# Patient Record
Sex: MALE | Race: WHITE
[De-identification: names, ages, dates, MRNs, and addresses within clinical notes are randomized per-mention and may not be internally consistent; named-entity substitution may affect disease eponyms.]

---

## 2019-05-01 ENCOUNTER — HOSPITAL ENCOUNTER (OUTPATIENT)
Age: 64
End: 2019-05-01
Payer: COMMERCIAL

## 2019-05-01 VITALS
OXYGEN SATURATION: 91 % | SYSTOLIC BLOOD PRESSURE: 125 MMHG | HEART RATE: 102 BPM | DIASTOLIC BLOOD PRESSURE: 80 MMHG | RESPIRATION RATE: 20 BRPM

## 2019-05-01 VITALS — HEART RATE: 74 BPM

## 2019-05-01 DIAGNOSIS — J44.9: ICD-10-CM

## 2019-05-01 DIAGNOSIS — Z87.891: ICD-10-CM

## 2019-05-01 DIAGNOSIS — R06.02: ICD-10-CM

## 2019-05-01 DIAGNOSIS — Z12.2: Primary | ICD-10-CM

## 2019-05-01 PROCEDURE — 94618 PULMONARY STRESS TESTING: CPT

## 2019-05-01 PROCEDURE — 94060 EVALUATION OF WHEEZING: CPT

## 2019-05-01 PROCEDURE — 94729 DIFFUSING CAPACITY: CPT

## 2019-05-01 PROCEDURE — 94640 AIRWAY INHALATION TREATMENT: CPT

## 2019-05-01 PROCEDURE — 94726 PLETHYSMOGRAPHY LUNG VOLUMES: CPT

## 2019-08-01 ENCOUNTER — HOSPITAL ENCOUNTER (OUTPATIENT)
Age: 64
End: 2019-08-01
Payer: COMMERCIAL

## 2019-08-01 DIAGNOSIS — J44.9: Primary | ICD-10-CM

## 2019-08-01 PROCEDURE — 94618 PULMONARY STRESS TESTING: CPT

## 2024-10-01 ENCOUNTER — OFFICE VISIT (OUTPATIENT)
Dept: CARDIOLOGY | Facility: CLINIC | Age: 69
End: 2024-10-01
Payer: COMMERCIAL

## 2024-10-01 VITALS
HEIGHT: 69 IN | SYSTOLIC BLOOD PRESSURE: 120 MMHG | WEIGHT: 214 LBS | BODY MASS INDEX: 31.7 KG/M2 | HEART RATE: 73 BPM | DIASTOLIC BLOOD PRESSURE: 70 MMHG | OXYGEN SATURATION: 93 %

## 2024-10-01 DIAGNOSIS — I10 PRIMARY HYPERTENSION: ICD-10-CM

## 2024-10-01 DIAGNOSIS — R07.2 PRECORDIAL CHEST PAIN: Primary | ICD-10-CM

## 2024-10-01 DIAGNOSIS — G47.33 OSA (OBSTRUCTIVE SLEEP APNEA): ICD-10-CM

## 2024-10-01 PROCEDURE — 99204 OFFICE O/P NEW MOD 45 MIN: CPT | Performed by: NURSE PRACTITIONER

## 2024-10-01 RX ORDER — ATORVASTATIN CALCIUM 10 MG/1
1 TABLET, FILM COATED ORAL DAILY
COMMUNITY

## 2024-10-01 RX ORDER — AMLODIPINE BESYLATE 10 MG/1
1 TABLET ORAL DAILY
COMMUNITY

## 2024-10-01 RX ORDER — MONTELUKAST SODIUM 4 MG/1
4 TABLET, CHEWABLE ORAL NIGHTLY
COMMUNITY

## 2024-10-01 RX ORDER — FLUTICASONE FUROATE, UMECLIDINIUM BROMIDE AND VILANTEROL TRIFENATATE 100; 62.5; 25 UG/1; UG/1; UG/1
1 POWDER RESPIRATORY (INHALATION) DAILY
COMMUNITY
Start: 2024-09-16

## 2024-10-01 RX ORDER — HYDROXYZINE HYDROCHLORIDE 25 MG/1
TABLET, FILM COATED ORAL
COMMUNITY

## 2024-10-01 RX ORDER — EMPAGLIFLOZIN 10 MG/1
TABLET, FILM COATED ORAL
COMMUNITY

## 2024-10-01 RX ORDER — ALBUTEROL SULFATE 90 UG/1
INHALANT RESPIRATORY (INHALATION)
COMMUNITY

## 2024-10-01 RX ORDER — LISINOPRIL AND HYDROCHLOROTHIAZIDE 20; 25 MG/1; MG/1
1 TABLET ORAL 2 TIMES DAILY
COMMUNITY

## 2024-10-01 NOTE — ASSESSMENT & PLAN NOTE
He reports a previous history of ARSALAN but has never used PAP therapy and does not want to try at this time.  He reports that he sleeps very well and denies excessive daytime sleepiness or fatigue.

## 2024-10-01 NOTE — PROGRESS NOTES
[facility] Provider Note     Date:   10/01/2024   Name: Manuel Sanon  :   1955  PCP: Odalis Lieberman APRN    Chief Complaint   Patient presents with    Establish Care       Subjective     History of Present Illness  Manuel Sanon is a 69 y.o. male with past medical history of HTN, COPD, DM, GERD, Hypothyroidism, ARSALAN (not on PAP therapy) and an an MI in  due severe anemia who presents today for new patient evaluation for chest pain. Patient was evaluated at [facility] ER on 24 with complaints of chest pain. Workup in ER was unremarkable and he was instructed to follow up with cardiology for further evaluation and testing. EKG in ER revealed normal sinus rhythm with a right bundle branch block.  He has not had any recurrent episodes of chest tightness since he was evaluated in the ER.  He describes the pain as a heaviness and tightness in the center of his chest that lasted approximately 24 hours.  He he denies shortness of breath, palpitations, dizziness, lower extremity edema or syncope.  He had a previous left heart cath in  that showed near normal coronary arteries. He quit smoking in .  He reports a previous history of ARSALAN but has never used PAP therapy and does not want to try at this time.  He reports that he sleeps very well and denies excessive daytime sleepiness or fatigue.    Cardiac History  1. HTN  2. HLD  3. DM  4. ARSALAN not on PAP therapy    [facility] - Near normal coronary arteries     Reports Denies   Chest Pain [x] []   Shortness of Air [] [x]   Palpitations [] [x]   Edema [] [x]   Dizziness [] [x]   Syncope [] [x]       No Known Allergies    Current Outpatient Medications:     albuterol sulfate  (90 Base) MCG/ACT inhaler, INHALE 2 PUFFS BY MOUTH EVERY 4 HOURS, Disp: , Rfl:     amLODIPine (NORVASC) 10 MG tablet, Take 1 tablet by mouth Daily., Disp: , Rfl:     atorvastatin (LIPITOR) 10 MG tablet, Take 1 tablet by mouth Daily.,  "Disp: , Rfl:     hydrOXYzine (ATARAX) 25 MG tablet, TAKE 1 TABLET BY MOUTH DAILY AT BEDTIME AS NEEDED, Disp: , Rfl:     Jardiance 10 MG tablet tablet, TAKE 1 TABLET BY MOUTH EVERY DAY FOR DIABETES, Disp: , Rfl:     lisinopril-hydrochlorothiazide (PRINZIDE,ZESTORETIC) 20-25 MG per tablet, Take 1 tablet by mouth 2 (Two) Times a Day., Disp: , Rfl:     metFORMIN (GLUCOPHAGE) 1000 MG tablet, Take 1 tablet by mouth 2 (Two) Times a Day With Meals., Disp: , Rfl:     montelukast (SINGULAIR) 4 MG chewable tablet, Chew 1 tablet Every Night., Disp: , Rfl:     Trelegy Ellipta 100-62.5-25 MCG/ACT inhaler, Inhale 1 puff Daily., Disp: , Rfl:     Past Medical History:   Diagnosis Date    Colon cancer 2019    stage 3      History reviewed. No pertinent surgical history.  History reviewed. No pertinent family history.  Social History     Socioeconomic History    Marital status: Single   Tobacco Use    Smoking status: Former     Types: Cigarettes     Passive exposure: Past    Smokeless tobacco: Never   Vaping Use    Vaping status: Never Used   Substance and Sexual Activity    Alcohol use: Never    Drug use: Never    Sexual activity: Defer       Objective     Vital Signs:  /70   Pulse 73   Ht 175.3 cm (69\")   Wt 97.1 kg (214 lb)   SpO2 93%   BMI 31.60 kg/m²   Estimated body mass index is 31.6 kg/m² as calculated from the following:    Height as of this encounter: 175.3 cm (69\").    Weight as of this encounter: 97.1 kg (214 lb).       BMI is >= 30 and <35. (Class 1 Obesity). The following options were offered after discussion;: exercise counseling/recommendations      Physical Exam  Vitals reviewed.   Constitutional:       Appearance: Normal appearance.   HENT:      Head: Normocephalic.   Cardiovascular:      Rate and Rhythm: Normal rate and regular rhythm.      Heart sounds: Normal heart sounds.   Pulmonary:      Effort: Pulmonary effort is normal.      Breath sounds: Normal breath sounds.   Musculoskeletal:      Right lower " leg: No edema.      Left lower leg: No edema.   Skin:     General: Skin is warm and dry.      Capillary Refill: Capillary refill takes less than 2 seconds.   Neurological:      General: No focal deficit present.      Mental Status: He is alert and oriented to person, place, and time.   Psychiatric:         Mood and Affect: Mood normal.         Behavior: Behavior normal.           Recent hospitalization notes reviewed: [facility] ER notes reviewed.           Assessment and Plan     Diagnoses and all orders for this visit:    1. Precordial chest pain (Primary)  Assessment & Plan:  Recent episodes of chest tightness and heaviness.  ER evaluation on 9/28/2024 was unremarkable and he was referred here for further evaluation and testing.    - Stress echo to rule out ischemia.    Orders:  -     Adult Transthoracic Echo Complete W/ Cont if Necessary Per Protocol; Future  -     Adult Stress Echo W/ Cont or Stress Agent if Necessary Per Protocol; Future    2. Primary hypertension  Assessment & Plan:  Hypertension is stable and controlled  Continue current treatment regimen.  Dietary sodium restriction.  Weight loss.  Regular aerobic exercise.  Blood pressure will be reassessed in 1 month.      3. ARSALAN (obstructive sleep apnea)  Assessment & Plan:  He reports a previous history of ARSALAN but has never used PAP therapy and does not want to try at this time.  He reports that he sleeps very well and denies excessive daytime sleepiness or fatigue.          Recommendations: ER if symptoms increase and Report if any new/changing symptoms immediately          Follow Up  Return in about 4 weeks (around 10/29/2024) for cardiac testing results.  Patient was given instructions and counseling regarding his condition or for health maintenance advice. Please see specific information pulled into the AVS if appropriate.

## 2024-10-01 NOTE — ASSESSMENT & PLAN NOTE
Hypertension is stable and controlled  Continue current treatment regimen.  Dietary sodium restriction.  Weight loss.  Regular aerobic exercise.  Blood pressure will be reassessed in 1 month.

## 2024-10-01 NOTE — ASSESSMENT & PLAN NOTE
Recent episodes of chest tightness and heaviness.  ER evaluation on 9/28/2024 was unremarkable and he was referred here for further evaluation and testing.    - Stress echo to rule out ischemia.

## 2024-10-18 ENCOUNTER — TELEPHONE (OUTPATIENT)
Dept: CARDIOLOGY | Facility: CLINIC | Age: 69
End: 2024-10-18

## 2024-10-18 NOTE — TELEPHONE ENCOUNTER
Caller: Manuel Sanon    Relationship: Self    Best call back number: [phone]     What is the best time to reach you: ANY     What was the call regarding: PT REPORTS THAT THEY WERE GIVEN A CALL BUT THERE IS NO DOCUMENTATION IN THE CHART ABOUT THIS. IF THIS WAS NOT A MISTAKE, PLEASE CALL PT BACK.     Is it okay if the provider responds through MyChart: EITHER

## 2024-11-04 ENCOUNTER — OFFICE VISIT (OUTPATIENT)
Dept: CARDIOLOGY | Facility: CLINIC | Age: 69
End: 2024-11-04
Payer: COMMERCIAL

## 2024-11-04 VITALS
HEART RATE: 100 BPM | OXYGEN SATURATION: 94 % | HEIGHT: 69 IN | BODY MASS INDEX: 30.51 KG/M2 | DIASTOLIC BLOOD PRESSURE: 58 MMHG | SYSTOLIC BLOOD PRESSURE: 112 MMHG | RESPIRATION RATE: 18 BRPM | WEIGHT: 206 LBS

## 2024-11-04 DIAGNOSIS — G47.33 OSA (OBSTRUCTIVE SLEEP APNEA): ICD-10-CM

## 2024-11-04 DIAGNOSIS — I10 PRIMARY HYPERTENSION: ICD-10-CM

## 2024-11-04 DIAGNOSIS — R07.2 PRECORDIAL CHEST PAIN: Primary | ICD-10-CM

## 2024-11-04 PROCEDURE — 99214 OFFICE O/P EST MOD 30 MIN: CPT | Performed by: NURSE PRACTITIONER

## 2024-11-04 NOTE — ASSESSMENT & PLAN NOTE
Hypertension is stable and controlled  Continue current treatment regimen.  Dietary sodium restriction.  Weight loss.  Regular aerobic exercise.  Blood pressure will be reassessed in 3 months

## 2024-11-04 NOTE — PROGRESS NOTES
[facility] Provider Note     Date:   2024   Name: Manuel Sanon  :   1955  PCP: Odalis Lieberman APRN    Chief Complaint   Patient presents with    Hypertension       Subjective     History of Present Illness  Manuel Sanon is a 69 y.o. male with past medical history of HTN, COPD, DM, GERD, Hypothyroidism, ARSALAN (not on PAP therapy) and an an MI in  due severe anemia who presents today for follow up on cardiac testing. Patient was evaluated at [facility] ER on 24 with complaints of chest pain. Workup in ER was unremarkable. He completed an echocardiogram on 10/3/2024 that revealed an LVEF of 66 to 70%, grade 1 diastolic dysfunction and no significant valvular regurgitation or stenosis.  He completed a stress echo today that revealed an abnormal stress echo consistent with an intermediate risk study for myocardial ischemia.    He has not had any recurrent episodes of chest pain since he was evaluated in the ER.  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  He denies any exertional chest pain or shortness of breath.  He reports that all of his symptoms have resolved since ER visit.  He had a previous left heart cath in  that showed near normal coronary arteries. He quit smoking in .  He reports a previous history of ARSALAN but has never used PAP therapy and does not want to try at this time.  He reports that he sleeps very well and denies excessive daytime sleepiness or fatigue.     Cardiac History  1. HTN  2. HLD  3. DM  4. ARSALAN not on PAP therapy    Stress Echo 24-abnormal stress echo consistent with an intermediate risk study for myocardial ischemia.    Echocardiogram 10/3/2024-LVEF 66-70%.  Grade 1 diastolic dysfunction.  Normal RVSP.  No significant valvular regurgitation or stenosis.    [facility] - Near normal coronary arteries     Reports Denies   Chest Pain [] [x]   Shortness of Air [] [x]   Palpitations [] [x]   Edema [] [x]  "  Dizziness [] [x]   Syncope [] [x]       No Known Allergies    Current Outpatient Medications:     albuterol sulfate  (90 Base) MCG/ACT inhaler, INHALE 2 PUFFS BY MOUTH EVERY 4 HOURS, Disp: , Rfl:     amLODIPine (NORVASC) 10 MG tablet, Take 1 tablet by mouth Daily., Disp: , Rfl:     atorvastatin (LIPITOR) 10 MG tablet, Take 1 tablet by mouth Daily., Disp: , Rfl:     hydrOXYzine (ATARAX) 25 MG tablet, TAKE 1 TABLET BY MOUTH DAILY AT BEDTIME AS NEEDED, Disp: , Rfl:     Jardiance 10 MG tablet tablet, TAKE 1 TABLET BY MOUTH EVERY DAY FOR DIABETES, Disp: , Rfl:     lisinopril-hydrochlorothiazide (PRINZIDE,ZESTORETIC) 20-25 MG per tablet, Take 1 tablet by mouth 2 (Two) Times a Day., Disp: , Rfl:     metFORMIN (GLUCOPHAGE) 1000 MG tablet, Take 1 tablet by mouth 2 (Two) Times a Day With Meals., Disp: , Rfl:     montelukast (SINGULAIR) 4 MG chewable tablet, Chew 1 tablet Every Night., Disp: , Rfl:     Trelegy Ellipta 100-62.5-25 MCG/ACT inhaler, Inhale 1 puff Daily., Disp: , Rfl:     Past Medical History:   Diagnosis Date    Colon cancer 2019    stage 3      History reviewed. No pertinent surgical history.  History reviewed. No pertinent family history.  Social History     Socioeconomic History    Marital status: Single   Tobacco Use    Smoking status: Former     Types: Cigarettes     Passive exposure: Past    Smokeless tobacco: Never   Vaping Use    Vaping status: Never Used   Substance and Sexual Activity    Alcohol use: Never    Drug use: Never    Sexual activity: Defer       Objective     Vital Signs:  /58 (BP Location: Left arm, Patient Position: Sitting, Cuff Size: Adult)   Pulse 100   Resp 18   Ht 175.3 cm (69\")   Wt 93.4 kg (206 lb)   SpO2 94%   BMI 30.42 kg/m²   Estimated body mass index is 30.42 kg/m² as calculated from the following:    Height as of this encounter: 175.3 cm (69\").    Weight as of this encounter: 93.4 kg (206 lb).               Physical Exam  Vitals reviewed.   Constitutional:  "      Appearance: Normal appearance.   HENT:      Head: Normocephalic.   Cardiovascular:      Rate and Rhythm: Normal rate and regular rhythm.      Heart sounds: Normal heart sounds.   Pulmonary:      Effort: Pulmonary effort is normal.      Breath sounds: Normal breath sounds.   Musculoskeletal:      Right lower leg: No edema.      Left lower leg: No edema.   Skin:     General: Skin is warm and dry.      Capillary Refill: Capillary refill takes less than 2 seconds.   Neurological:      General: No focal deficit present.      Mental Status: He is alert and oriented to person, place, and time.   Psychiatric:         Mood and Affect: Mood normal.         Behavior: Behavior normal.           Cardiology studies reviewed: stress test and echo reviewed          Assessment and Plan     Diagnoses and all orders for this visit:    1. Precordial chest pain (Primary)  Assessment & Plan:  Chest pain has resolved  He completed an echocardiogram on 10/3/2024 that revealed an LVEF of 66 to 70%, grade 1 diastolic dysfunction and no significant valvular regurgitation or stenosis.  He completed a stress echo today that revealed an abnormal stress echo consistent with an intermediate risk study for myocardial ischemia.   He has not had any recurrent episodes of chest pain since he was evaluated in the ER.  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  He denies any exertional chest pain or shortness of breath.    We discussed further cardiac testing (coronary CTA) due to abnormal stress test.  He does not want to proceed with any further cardiac testing at this time due to resolution of symptoms.  I instructed him to let us know if he starts experiencing symptoms again and we will proceed with a coronary CTA or left heart cath.    - Close follow-up in 3 months      2. Primary hypertension  Assessment & Plan:  Hypertension is stable and controlled  Continue current treatment regimen.  Dietary sodium restriction.  Weight  loss.  Regular aerobic exercise.  Blood pressure will be reassessed in 3 months      3. ARSALAN (obstructive sleep apnea)  Assessment & Plan:  He reports a previous history of ARSALAN but has never used PAP therapy and does not want to try at this time. He reports that he sleeps very well and denies excessive daytime sleepiness or fatigue.           Recommendations: ER if symptoms increase and Report if any new/changing symptoms immediately          Follow Up  Return in about 3 months (around 2/4/2025) for abnormal stress test .  Patient was given instructions and counseling regarding his condition or for health maintenance advice. Please see specific information pulled into the AVS if appropriate.

## 2024-11-04 NOTE — ASSESSMENT & PLAN NOTE
Chest pain has resolved  He completed an echocardiogram on 10/3/2024 that revealed an LVEF of 66 to 70%, grade 1 diastolic dysfunction and no significant valvular regurgitation or stenosis.  He completed a stress echo today that revealed an abnormal stress echo consistent with an intermediate risk study for myocardial ischemia.   He has not had any recurrent episodes of chest pain since he was evaluated in the ER.  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  He denies any exertional chest pain or shortness of breath.    We discussed further cardiac testing (coronary CTA) due to abnormal stress test.  He does not want to proceed with any further cardiac testing at this time due to resolution of symptoms.  I instructed him to let us know if he starts experiencing symptoms again and we will proceed with a coronary CTA or left heart cath.    - Close follow-up in 3 months

## 2024-11-11 ENCOUNTER — TELEPHONE (OUTPATIENT)
Dept: CARDIOLOGY | Facility: CLINIC | Age: 69
End: 2024-11-11
Payer: COMMERCIAL

## 2024-11-11 DIAGNOSIS — R07.2 PRECORDIAL CHEST PAIN: Primary | ICD-10-CM

## 2024-11-11 RX ORDER — METOPROLOL TARTRATE 50 MG
50 TABLET ORAL
OUTPATIENT
Start: 2024-11-11

## 2024-11-11 RX ORDER — SODIUM CHLORIDE 0.9 % (FLUSH) 0.9 %
10 SYRINGE (ML) INJECTION AS NEEDED
OUTPATIENT
Start: 2024-11-11

## 2024-11-11 RX ORDER — METOPROLOL TARTRATE 100 MG/1
TABLET ORAL
Qty: 2 TABLET | Refills: 0 | Status: SHIPPED | OUTPATIENT
Start: 2024-11-11

## 2024-11-11 RX ORDER — IVABRADINE 5 MG/1
15 TABLET, FILM COATED ORAL ONCE
OUTPATIENT
Start: 2024-11-11 | End: 2024-11-11

## 2024-11-11 RX ORDER — METOPROLOL TARTRATE 25 MG/1
200 TABLET, FILM COATED ORAL ONCE
OUTPATIENT
Start: 2024-11-11 | End: 2024-11-11

## 2024-11-11 RX ORDER — METOPROLOL TARTRATE 25 MG/1
150 TABLET, FILM COATED ORAL ONCE
OUTPATIENT
Start: 2024-11-11

## 2024-11-11 RX ORDER — SODIUM CHLORIDE 9 MG/ML
40 INJECTION, SOLUTION INTRAVENOUS AS NEEDED
OUTPATIENT
Start: 2024-11-11

## 2024-11-11 RX ORDER — METOPROLOL TARTRATE 25 MG/1
50 TABLET, FILM COATED ORAL ONCE
OUTPATIENT
Start: 2024-11-11

## 2024-11-11 RX ORDER — METOPROLOL TARTRATE 25 MG/1
25 TABLET, FILM COATED ORAL ONCE
OUTPATIENT
Start: 2024-11-11

## 2024-11-11 RX ORDER — NITROGLYCERIN 0.4 MG/1
0.4 TABLET SUBLINGUAL
OUTPATIENT
Start: 2024-11-11 | End: 2024-11-11

## 2024-11-11 RX ORDER — METOPROLOL TARTRATE 1 MG/ML
5 INJECTION, SOLUTION INTRAVENOUS
OUTPATIENT
Start: 2024-11-11

## 2024-11-11 RX ORDER — METOPROLOL TARTRATE 25 MG/1
100 TABLET, FILM COATED ORAL ONCE
OUTPATIENT
Start: 2024-11-11

## 2024-11-11 RX ORDER — SODIUM CHLORIDE 0.9 % (FLUSH) 0.9 %
10 SYRINGE (ML) INJECTION EVERY 12 HOURS SCHEDULED
OUTPATIENT
Start: 2024-11-11

## 2024-11-11 RX ORDER — NITROGLYCERIN 0.4 MG/1
0.8 TABLET SUBLINGUAL
OUTPATIENT
Start: 2024-11-11

## 2024-11-11 NOTE — TELEPHONE ENCOUNTER
Patient called and stated he wanted to move forward with the CCTA at [place]. He isnt experiencing any symptoms but wants to go ahead with it

## 2024-11-11 NOTE — TELEPHONE ENCOUNTER
Patient called and stated he wanted to move forward with the CCTA at [place]. He isnt experiencing any symptoms but wants to go ahead with it

## 2024-11-14 NOTE — TELEPHONE ENCOUNTER
I called central scheduling and had them schedule his test. I called patient with his date and time. Went over instructions with him- NPO 2 hours prior, no caffeine after midnight, and arrival time is 07:15 am at [place]. I also gave him the metoprolol instructions that Provider had stated below. Patient stated he understood.

## 2024-12-04 ENCOUNTER — TELEPHONE (OUTPATIENT)
Facility: HOSPITAL | Age: 69
End: 2024-12-04
Payer: COMMERCIAL

## 2024-12-04 NOTE — TELEPHONE ENCOUNTER
Attempted to contact patient as pre-procedure phone call prior to planned CT angiogram coronary planned for 12/5/24. Left voicemail message reminder with arrival time of 0730 to main registration, no caffeine after midnight, okay to take medications as per normal routine unless taking a stimulant,  is recommended, and if have any questions may contact outpatient prep and recovery at [phone].

## 2024-12-05 ENCOUNTER — HOSPITAL ENCOUNTER (OUTPATIENT)
Facility: HOSPITAL | Age: 69
Discharge: HOME | End: 2024-12-05
Payer: COMMERCIAL

## 2024-12-05 VITALS
SYSTOLIC BLOOD PRESSURE: 126 MMHG | WEIGHT: 209.33 LBS | RESPIRATION RATE: 16 BRPM | DIASTOLIC BLOOD PRESSURE: 73 MMHG | OXYGEN SATURATION: 95 % | HEIGHT: 69 IN | BODY MASS INDEX: 31 KG/M2 | TEMPERATURE: 98.3 F | HEART RATE: 69 BPM

## 2024-12-05 DIAGNOSIS — R07.2 PRECORDIAL CHEST PAIN: ICD-10-CM

## 2024-12-05 LAB
CREAT BLDA-MCNC: 1.3 MG/DL (ref 0.6–1.3)
CREAT BLDA-MCNC: 1.3 MG/DL (ref 0.6–1.3)

## 2024-12-05 PROCEDURE — 82565 ASSAY OF CREATININE: CPT

## 2024-12-05 PROCEDURE — 75574 CT ANGIO HRT W/3D IMAGE: CPT

## 2024-12-05 PROCEDURE — 25510000001 IOPAMIDOL PER 1 ML: Performed by: NURSE PRACTITIONER

## 2024-12-05 PROCEDURE — 75574 CT ANGIO HRT W/3D IMAGE: CPT | Performed by: INTERNAL MEDICINE

## 2024-12-05 RX ORDER — METOPROLOL TARTRATE 100 MG/1
100 TABLET ORAL ONCE
Status: DISCONTINUED | OUTPATIENT
Start: 2024-12-05 | End: 2024-12-06 | Stop reason: HOSPADM

## 2024-12-05 RX ORDER — SODIUM CHLORIDE 9 MG/ML
40 INJECTION, SOLUTION INTRAVENOUS AS NEEDED
Status: DISCONTINUED | OUTPATIENT
Start: 2024-12-05 | End: 2024-12-06 | Stop reason: HOSPADM

## 2024-12-05 RX ORDER — NITROGLYCERIN 0.4 MG/1
0.8 TABLET SUBLINGUAL
Status: COMPLETED | OUTPATIENT
Start: 2024-12-05 | End: 2024-12-05

## 2024-12-05 RX ORDER — SODIUM CHLORIDE 0.9 % (FLUSH) 0.9 %
10 SYRINGE (ML) INJECTION EVERY 12 HOURS SCHEDULED
Status: DISCONTINUED | OUTPATIENT
Start: 2024-12-05 | End: 2024-12-06 | Stop reason: HOSPADM

## 2024-12-05 RX ORDER — SODIUM CHLORIDE 0.9 % (FLUSH) 0.9 %
10 SYRINGE (ML) INJECTION AS NEEDED
Status: DISCONTINUED | OUTPATIENT
Start: 2024-12-05 | End: 2024-12-06 | Stop reason: HOSPADM

## 2024-12-05 RX ORDER — METOPROLOL TARTRATE 25 MG/1
50 TABLET, FILM COATED ORAL
Status: DISCONTINUED | OUTPATIENT
Start: 2024-12-05 | End: 2024-12-06 | Stop reason: HOSPADM

## 2024-12-05 RX ORDER — METOPROLOL TARTRATE 25 MG/1
25 TABLET, FILM COATED ORAL ONCE
Status: DISCONTINUED | OUTPATIENT
Start: 2024-12-05 | End: 2024-12-06 | Stop reason: HOSPADM

## 2024-12-05 RX ORDER — METOPROLOL TARTRATE 1 MG/ML
5 INJECTION, SOLUTION INTRAVENOUS
Status: DISCONTINUED | OUTPATIENT
Start: 2024-12-05 | End: 2024-12-06 | Stop reason: HOSPADM

## 2024-12-05 RX ORDER — METOPROLOL TARTRATE 25 MG/1
50 TABLET, FILM COATED ORAL ONCE
Status: DISCONTINUED | OUTPATIENT
Start: 2024-12-05 | End: 2024-12-06 | Stop reason: HOSPADM

## 2024-12-05 RX ORDER — METOPROLOL TARTRATE 100 MG/1
200 TABLET ORAL ONCE
Status: DISCONTINUED | OUTPATIENT
Start: 2024-12-05 | End: 2024-12-06 | Stop reason: HOSPADM

## 2024-12-05 RX ORDER — IOPAMIDOL 755 MG/ML
100 INJECTION, SOLUTION INTRAVASCULAR
Status: COMPLETED | OUTPATIENT
Start: 2024-12-05 | End: 2024-12-05

## 2024-12-05 RX ORDER — NITROGLYCERIN 0.4 MG/1
0.4 TABLET SUBLINGUAL
Status: COMPLETED | OUTPATIENT
Start: 2024-12-05 | End: 2024-12-05

## 2024-12-05 RX ORDER — IVABRADINE 5 MG/1
15 TABLET, FILM COATED ORAL ONCE
Status: COMPLETED | OUTPATIENT
Start: 2024-12-05 | End: 2024-12-05

## 2024-12-05 RX ADMIN — IOPAMIDOL 70 ML: 755 INJECTION, SOLUTION INTRAVENOUS at 10:25

## 2024-12-05 RX ADMIN — NITROGLYCERIN 0.8 MG: 0.4 TABLET SUBLINGUAL at 10:05

## 2024-12-05 RX ADMIN — IVABRADINE 15 MG: 5 TABLET, FILM COATED ORAL at 07:51

## 2024-12-05 NOTE — DISCHARGE INSTRUCTIONS
MAKE SURE TO DRINK PLENTY OF FLUIDS OF FOR THE NEXT 48 HOURS TO FLUSH THE CONTRAST DYE THROUGH YOUR KIDNEYS TO PROTECT RENAL FUNCTION. DO NOT TAKE METFORMIN FOR THE NEXT 48 HOURS